# Patient Record
Sex: MALE | Race: WHITE | NOT HISPANIC OR LATINO | URBAN - METROPOLITAN AREA
[De-identification: names, ages, dates, MRNs, and addresses within clinical notes are randomized per-mention and may not be internally consistent; named-entity substitution may affect disease eponyms.]

---

## 2022-05-12 PROBLEM — E06.3 HASHIMOTO'S DISEASE: Status: ACTIVE | Noted: 2022-05-12

## 2022-05-12 PROBLEM — E55.9 VITAMIN D DEFICIENCY: Status: ACTIVE | Noted: 2022-05-12

## 2022-05-12 PROBLEM — R79.89 ABNORMAL TSH: Status: ACTIVE | Noted: 2022-05-12

## 2022-07-01 ENCOUNTER — OFFICE VISIT (OUTPATIENT)
Dept: ENDOCRINOLOGY | Age: 32
End: 2022-07-01

## 2022-07-01 VITALS
BODY MASS INDEX: 35.73 KG/M2 | DIASTOLIC BLOOD PRESSURE: 80 MMHG | HEIGHT: 70 IN | WEIGHT: 249.6 LBS | SYSTOLIC BLOOD PRESSURE: 120 MMHG | TEMPERATURE: 100.4 F | OXYGEN SATURATION: 98 % | HEART RATE: 67 BPM

## 2022-07-01 DIAGNOSIS — R79.89 ABNORMAL TSH: ICD-10-CM

## 2022-07-01 DIAGNOSIS — E06.3 HASHIMOTO'S DISEASE: Primary | ICD-10-CM

## 2022-07-01 PROBLEM — I10 ESSENTIAL HYPERTENSION: Status: ACTIVE | Noted: 2018-01-25

## 2022-07-01 PROBLEM — E04.9 NODULAR GOITER: Status: ACTIVE | Noted: 2022-06-01

## 2022-07-01 PROCEDURE — 99204 OFFICE O/P NEW MOD 45 MIN: CPT | Performed by: INTERNAL MEDICINE

## 2022-07-01 RX ORDER — LISINOPRIL 10 MG/1
10 TABLET ORAL DAILY
COMMUNITY
Start: 2022-05-10

## 2022-07-01 RX ORDER — ROSUVASTATIN CALCIUM 10 MG/1
10 TABLET, COATED ORAL
COMMUNITY
Start: 2022-04-12

## 2022-07-01 RX ORDER — CETIRIZINE HYDROCHLORIDE 10 MG/1
10 TABLET ORAL DAILY
COMMUNITY
Start: 2022-05-12

## 2022-07-01 RX ORDER — CHOLECALCIFEROL (VITAMIN D3) 1250 MCG
CAPSULE ORAL
COMMUNITY
Start: 2022-04-04

## 2022-07-01 NOTE — PATIENT INSTRUCTIONS
As discussed the Hashimoto's process is not new has been present since early 2017. That process can cause hypothyroidism over time, but so far you have not become hypothyroid as the TSH is just slightly over normal has not been >10 uIU/mL over normal and functional hormones have been normal. For this all that is needed is ongoing periodic check of the TSH to ensure remains stable and does not progress to higher level.      As discussed related to the new finding of nodular goiter, that is a separate process and we were not provided any information on that topic short of what you provided today. Would need the ultrasound report to offer further input. For the biopsy you already had the result was indeterminate and by standard practice they sent for a genetic marker test. That result is what will make final determination about the nodule being benign or not. If it is resulted as benign then nothing more to do at this time just follow up in a year with another ultrasound. If that result is reported as concerning for malignancy, then you would need to see a surgeon about surgery to remove the thyroid as primary treatment for that. Let us know the outcome of that genetic marker test and if needed we can help coordinate things for surgery.